# Patient Record
Sex: FEMALE | Race: WHITE | Employment: OTHER | URBAN - METROPOLITAN AREA
[De-identification: names, ages, dates, MRNs, and addresses within clinical notes are randomized per-mention and may not be internally consistent; named-entity substitution may affect disease eponyms.]

---

## 2018-08-14 PROBLEM — E66.01 OBESITY, MORBID (HCC): Status: ACTIVE | Noted: 2018-08-14

## 2018-09-20 ENCOUNTER — APPOINTMENT (OUTPATIENT)
Dept: PHYSICAL THERAPY | Age: 78
End: 2018-09-20
Payer: MEDICARE

## 2018-09-24 ENCOUNTER — HOSPITAL ENCOUNTER (OUTPATIENT)
Dept: PHYSICAL THERAPY | Age: 78
Discharge: HOME | End: 2018-09-24
Payer: MEDICARE

## 2018-09-24 PROCEDURE — 97162 PT EVAL MOD COMPLEX 30 MIN: CPT | Performed by: PHYSICAL THERAPIST

## 2018-09-24 PROCEDURE — G8978 MOBILITY CURRENT STATUS: HCPCS | Performed by: PHYSICAL THERAPIST

## 2018-09-24 PROCEDURE — G8979 MOBILITY GOAL STATUS: HCPCS | Performed by: PHYSICAL THERAPIST

## 2018-09-24 NOTE — PROGRESS NOTES
PHYSICAL THERAPY - DAILY TREATMENT NOTE Patient Name: Jose Garzon        Date: 2018 : 1940   YES Patient  Verified Visit #:     Insurance: Payor: Sharon Breath / Plan: Severiano Sosa / Product Type: Medicare / In time: 11:30 Out time: 12:05 Total Treatment Time: 35 Medicare Time Tracking (below) Total Timed Codes (min):  - 1:1 Treatment Time:  -  
 
TREATMENT AREA = Balance problem [R26.89] Gait disturbance [R26.9] SUBJECTIVE Pain Level (on 0 to 10 scale):  0  / 10 Medication Changes/New allergies or changes in medical history, any new surgeries or procedures? NO    If yes, update Summary List  
Subjective Functional Status/Changes:  []  No changes reported See eval/POC OBJECTIVE Modalities Rationale:   NA 
 min [] Estim, type/location:    
                                 []  att     []  unatt     []  w/US     []  w/ice    []  w/heat  
 min []  Mechanical Traction: type/lbs   
               []  pro   []  sup   []  int   []  cont    []  before manual    []  after manual  
 min []  Ultrasound, settings/location:    
 min []  Iontophoresis w/ dexamethasone, location:   
                                           []  take home patch       []  in clinic  
 min []  Ice     []  Heat    location/position:   
 min []  Vasopneumatic Device, press/temp:   
 min []  Other:   
[] Skin assessment post-treatment (if applicable):   
[]  intact    []  redness- no adverse reaction    
[]redness  adverse reaction:     
 
- min Therapeutic Exercise:  []  See flow sheet  
 
 min Therapeutic Activity:   
 
 
 min Neuromuscular Re-ed:   
 
 
 min Gait Training:   
 
 min Patient Education:  YES  Reviewed HEP []  Progressed/Changed HEP based on: Other Objective/Functional Measures: FOTO - 40 Post Treatment Pain Level (on 0 to 10) scale:   0  / 10 ASSESSMENT Assessment/Changes in Function: Signs and symptoms suggest general deconditioning and balance deficits. []  See Progress Note/Recertification Patient will continue to benefit from skilled PT services to modify and progress therapeutic interventions, address functional mobility deficits, address ROM deficits, address strength deficits, analyze and address soft tissue restrictions, analyze and cue movement patterns, analyze and modify body mechanics/ergonomics and assess and modify postural abnormalities to attain remaining goals. Progress toward goals / Updated goals: 
 
Goals established today PLAN [x]  Upgrade activities as tolerated YES Continue plan of care  
[]  Discharge due to :   
[]  Other:   
 
Therapist: Anabell English PT Date: 9/24/2018 Time: 11:20 AM  
 
No future appointments.

## 2018-09-24 NOTE — PROGRESS NOTES
[address] [address] [facility] [place] [facility]YSICAL [facility]ERAP[facility]  [facility] 
[facility] [address], [address] ,[address], [address]  Phone: [phone]  Fax: [phone] PLAN OF CARE / STATEMENT OF MEDICAL NECESSITY FOR PHYSICAL THERAPY SERVICES Patient Name: Celsa Padron : 1940 Medical  
Diagnosis: Balance problem [R26.89] Gait disturbance [R26.9] Treatment Diagnosis: Balance Deficits Onset Date: Chronic Referral Source: Sree Platt MD [facility]): 2018 Prior Hospitalization: See medical history Provider #: [id number] Prior Level of Function: Pt using rollator in/out of home Comorbidities: Charcot feet, h/o bilateral reverse TSA, bilateral knee surgeries (scars look like TKA), breast CA, HTN, fibromyalgia, thyroid dysufnction Medications: Verified on Patient Summary List  
The Plan of Care and following information is based on the information from the initial evaluation.  
=========================================================================================== Assessment / key information:  75 y/o female presents to PT with c/o decreased balance and endurancesince having 2 hospitalizations this year (repsiratory related), and recent right foot wound surgery and infection treatment with IV antibiotics. Pt had HHPT for several weeks after her surgery, and has been able to gradually resume walking with rollator instead of FFW. Pt wears high custom foot/ankle boots that stabilize her foot/ankle bilaterally when she is walking. She has been wearing these type of footwear to protect her Charcot feet, but she has resultant balance deficits due to limited ankle mobility. Examination reveals that pt is able to transfer her self up/down from armchair, but requires several trials at times. She is able to stand without holding onto walker for 30s, but feels off balance.   LE strength testing revealed 4/5 tests for bilateral hip and knee strength (ankle not assessed). Pt's spouse assist her with dressing, transfers, showers and transportation and pt wants to be more stable and independent with walking and transferring herself. Pt was 30 minutes late to today's appt and GRIDER balance assessment was not completed. Pt would benefit from PT to increase LE strength/endurance/balance to allow improved transfers and endurance with community ambulation. =========================================================================================== Eval Complexity: History HIGH Complexity :3+ comorbidities / personal factors will impact the outcome/ POC ;  Examination  HIGH Complexity : 4+ Standardized tests and measures addressing body structure, function, activity limitation and / or participation in recreation ; Presentation MEDIUM Complexity : Evolving with changing characteristics ; Decision Making MEDIUM Complexity : FOTO score of 26-74; Overall Complexity MEDIUM Problem List: decrease strength, impaired gait/ balance, decrease ADL/ functional abilitiies, decrease activity tolerance, decrease flexibility/ joint mobility and decrease transfer abilities Treatment Plan may include any combination of the following: Therapeutic exercise, Therapeutic activities, Neuromuscular re-education, Physical agent/modality, Gait/balance training, Manual therapy, Dry Needling, Aquatic therapy, Patient education, Self Care training, Functional mobility training, Home safety training and Stair training Patient / Family readiness to learn indicated by: asking questions, trying to perform skills and interestPersons(s) to be included in education: patient (P) and family support person (FSP);list spouse Barriers to Learning/Limitations: None Measures taken:   
Patient Goal (s): \"get better balance for walking\" Patient self reported health status: poor Rehabilitation Potential: good? Short Term Goals: To be accomplished in  2  weeks: 1. Pt independent with basic HEP for bilateral LE strengthening/endurance with supine and standing exercises. 2. Pt/spouse to note increased ease of leaving home and getting in/out of car to allow her to more frequently leave home. ? Long Term Goals: To be accomplished in  6  weeks: 1. Pt to increase FOTO score from 40 to >/= 49. 
2. Pt able to improve GRIDER balance score by 10%. 3. Pt independent with HEP for LE strengthening/endurance and balance exercises at home. Frequency / Duration:   Patient to be seen  2  times per week for 6  weeks: 
Patient / Caregiver education and instruction: self care, activity modification and exercises G-Codes (GP): Mobility [id number] Current  CL= 60-79%  [id number] Goal  CK= 40-59%. The severity rating is based on the FOTO Score Therapist Signature: David Guerrier PT Date: 9/24/2018 Certification Period: 9/24/18 - 12/24/18 Time: 11:20 AM  
=========================================================================================== I certify that the above Physical Therapy Services are being furnished while the patient is under my care. I agree with the treatment plan and certify that this therapy is necessary. Physician Signature:       Date:      Time:  Please sign and return to [facility] or you may fax the signed copy to [phone]. Thank you.

## 2018-09-27 ENCOUNTER — HOSPITAL ENCOUNTER (OUTPATIENT)
Dept: PHYSICAL THERAPY | Age: 78
Discharge: HOME | End: 2018-09-27
Payer: MEDICARE

## 2018-09-27 PROCEDURE — 97110 THERAPEUTIC EXERCISES: CPT | Performed by: PHYSICAL THERAPIST

## 2018-09-27 NOTE — PROGRESS NOTES
PHYSICAL THERAPY - DAILY TREATMENT NOTE Patient Name: Mireya Martins        Date: 2018 : 1940   YES Patient  Verified Visit #:   2   of     Insurance: Payor: Thiago Spotted / Plan: [place] MEDICARE PART A & B / Product Type: Medicare / In time: 2:30 Out time: 3:15 Total Treatment Time: 45 Medicare Time Tracking (below) Total Timed Codes (min):  45 1:1 Treatment Time:  45 TREATMENT AREA = Balance problem [R26.89] Gait disturbance [R26.9] SUBJECTIVE Pain Level (on 0 to 10 scale):  0  / 10 Medication Changes/New allergies or changes in medical history, any new surgeries or procedures? NO    If yes, update Summary List  
Subjective Functional Status/Changes:  []  No changes reported Pt reports having difficulty when trying to get her legs into bed without her  to help. OBJECTIVE Modalities Rationale:    NA 
 min [] Estim, type/location:    
                                 []  att     []  unatt     []  w/US     []  w/ice    []  w/heat  
 min []  Mechanical Traction: type/lbs   
               []  pro   []  sup   []  int   []  cont    []  before manual    []  after manual  
 min []  Ultrasound, settings/location:    
 min []  Iontophoresis w/ dexamethasone, location:   
                                           []  take home patch       []  in clinic  
 min []  Ice     []  Heat    location/position:   
 min []  Vasopneumatic Device, press/temp:   
 min []  Other:   
[] Skin assessment post-treatment (if applicable):   
[]  intact    []  redness- no adverse reaction    
[]redness  adverse reaction:     
 
45 min Therapeutic Exercise:  [x]  See flow sheet Rationale:      increase ROM, increase strength, improve coordination, improve balance and increase proprioception to improve the patients ability to increase standing stability/endurance  
 
 min Therapeutic Activity:   
 
 
 min Neuromuscular Re-ed:   
 
 
 min Gait Training: min Patient Education:  YES  Reviewed HEP []  Progressed/Changed HEP based on: Other Objective/Functional Measures: Added seated and standing exercises today per flowsheet GRIDER - 18 Post Treatment Pain Level (on 0 to 10) scale:   0  / 10 ASSESSMENT Assessment/Changes in Function:  
 
Pt required regular rest breaks, but was able to complete all therex today. []  See Progress Note/Recertification Patient will continue to benefit from skilled PT services to modify and progress therapeutic interventions, address functional mobility deficits, address ROM deficits, address strength deficits, analyze and address soft tissue restrictions, analyze and cue movement patterns, analyze and modify body mechanics/ergonomics and assess and modify postural abnormalities to attain remaining goals. Progress toward goals / Updated goals: Will progress strengthening and balance exercises as tolerated. PLAN [x]  Upgrade activities as tolerated YES Continue plan of care  
[]  Discharge due to :   
[]  Other:   
 
Therapist: Stafford Runner, PT Date: 9/27/2018 Time: 9:42 AM  
 
Future Appointments Date Time Provider Naun Torres 10/2/2018 3:30 PM Juju Lucas [facility]  
10/4/2018 2:30 PM Stafford Runner, PT [facility] [place]  
[facility] [place]  
10/11/2018 4:30 PM Irena Merida PT [facility]  
10/16/2018 3:30 PM Juju Shayy [facility]  
10/17/2018 5:00 PM Stafford Runner, PT [facility] [place]  
10/23/2018 3:30 PM Juju [facility]  
10/25/2018 3:30 PM Juju [facility]  
[facility] [place]  
10/31/2018 4:30 PM Stafford Runner, PT [facility] [place]

## 2018-10-02 ENCOUNTER — APPOINTMENT (OUTPATIENT)
Dept: PHYSICAL THERAPY | Age: 78
End: 2018-10-02
Payer: MEDICARE

## 2018-10-03 ENCOUNTER — HOSPITAL ENCOUNTER (OUTPATIENT)
Dept: PHYSICAL THERAPY | Age: 78
Discharge: HOME | End: 2018-10-03
Payer: MEDICARE

## 2018-10-03 PROCEDURE — 97110 THERAPEUTIC EXERCISES: CPT | Performed by: PHYSICAL THERAPIST

## 2018-10-03 NOTE — PROGRESS NOTES
PHYSICAL THERAPY - DAILY TREATMENT NOTE Patient Name: Madeline Carter        Date: 10/3/2018 : 1940   YES Patient  Verified Visit #:   3   of     Insurance: Payor: Dorian Bernal / Plan: Severiano Da Silvay / Product Type: Medicare / In time: 4:40 Out time: 5:20 Total Treatment Time: 40 Medicare Time Tracking (below) Total Timed Codes (min):  40 1:1 Treatment Time:  40 TREATMENT AREA = Balance problem [R26.89] Gait disturbance [R26.9] SUBJECTIVE Pain Level (on 0 to 10 scale):  0  / 10 Medication Changes/New allergies or changes in medical history, any new surgeries or procedures? NO    If yes, update Summary List  
Subjective Functional Status/Changes:  []  No changes reported Pt reports still having difficulty getting her legs in/out of bed on her own due to rails set up at bedside to keep her from rolling out of bed. OBJECTIVE Modalities Rationale:   NA 
 min [] Estim, type/location:    
                                 []  att     []  unatt     []  w/US     []  w/ice    []  w/heat  
 min []  Mechanical Traction: type/lbs   
               []  pro   []  sup   []  int   []  cont    []  before manual    []  after manual  
 min []  Ultrasound, settings/location:    
 min []  Iontophoresis w/ dexamethasone, location:   
                                           []  take home patch       []  in clinic  
 min []  Ice     []  Heat    location/position:   
 min []  Vasopneumatic Device, press/temp:   
 min []  Other:   
[] Skin assessment post-treatment (if applicable):   
[]  intact    []  redness- no adverse reaction    
[]redness  adverse reaction:     
 
40 min Therapeutic Exercise:  [x]  See flow sheet Rationale:      increase ROM, increase strength and improve coordination to improve the patients ability to increase LE strength/endurance  
 
 
 min Therapeutic Activity:   
 
 
 min Neuromuscular Re-ed:   
 
 
 min Gait Training: min Patient Education:  YES  Reviewed HEP []  Progressed/Changed HEP based on: Other Objective/Functional Measures: Added supine SLR, and sidelying SL Added 2lb resistance to LAQ Post Treatment Pain Level (on 0 to 10) scale:   0  / 10 ASSESSMENT Assessment/Changes in Function:  
 
Pt required regular rest breaks due to SOB. She had difficulty with sidelying L hip SLR. []  See Progress Note/Recertification Patient will continue to benefit from skilled PT services to modify and progress therapeutic interventions, address functional mobility deficits, address ROM deficits, address strength deficits, analyze and address soft tissue restrictions, analyze and cue movement patterns, analyze and modify body mechanics/ergonomics and assess and modify postural abnormalities to attain remaining goals. Progress toward goals / Updated goals: Will continue to progress therex for strength/endurance and balance as tolerated. PLAN [x]  Upgrade activities as tolerated YES Continue plan of care  
[]  Discharge due to :   
[]  Other:   
 
Therapist: Coleman Mendenhall PT Date: 10/3/2018 Time: 10:36 AM  
 
Future Appointments Date Time Provider Naun Torres 10/3/2018 4:30 PM [place] [facility] [place]  
10/4/2018 2:30 PM Coleman [place]enhall [facility] [place]  
10/9/2018 3:30 PM [facility]  
10/11/2018 4:30 PM [facility]  
[facility]  
10/17/2018 5:00 PM Coleman Mendenhall [facility] [place] [facility]  
[facility]  
10/25/2018 3:30 PM [facility]  
10/30/2018 3:30 PM [facility]  
10/31/2018 4:30 PM Coleman Mendenhall [facility]

## 2018-10-04 ENCOUNTER — HOSPITAL ENCOUNTER (OUTPATIENT)
Dept: PHYSICAL THERAPY | Age: 78
Discharge: HOME | End: 2018-10-04
Payer: MEDICARE

## 2018-10-04 PROCEDURE — 97110 THERAPEUTIC EXERCISES: CPT | Performed by: PHYSICAL THERAPIST

## 2018-10-04 NOTE — PROGRESS NOTES
PHYSICAL THERAPY - DAILY TREATMENT NOTE Patient Name: Mamta Wu        Date: 10/4/2018 : 1940   YES Patient  Verified Visit #:     Insurance: Payor: Cecilia Martins / Plan: Severiano Sosa / Product Type: Medicare / In time: 2:25 Out time: 3:05 Total Treatment Time: 40 Medicare Time Tracking (below) Total Timed Codes (min):  40 1:1 Treatment Time:  40 TREATMENT AREA = Balance problem [R26.89] Gait disturbance [R26.9] SUBJECTIVE Pain Level (on 0 to 10 scale):  0  / 10 Medication Changes/New allergies or changes in medical history, any new surgeries or procedures? NO    If yes, update Summary List  
Subjective Functional Status/Changes:  []  No changes reported Pt denies having any soreness in legs after session yesterday. OBJECTIVE Modalities Rationale:    NA 
 min [] Estim, type/location:    
                                 []  att     []  unatt     []  w/US     []  w/ice    []  w/heat  
 min []  Mechanical Traction: type/lbs   
               []  pro   []  sup   []  int   []  cont    []  before manual    []  after manual  
 min []  Ultrasound, settings/location:    
 min []  Iontophoresis w/ dexamethasone, location:   
                                           []  take home patch       []  in clinic  
 min []  Ice     []  Heat    location/position:   
 min []  Vasopneumatic Device, press/temp:   
 min []  Other:   
[] Skin assessment post-treatment (if applicable):   
[]  intact    []  redness- no adverse reaction    
[]redness  adverse reaction:     
 
40 min Therapeutic Exercise:  [x]  See flow sheet Rationale:      increase ROM, increase strength and improve coordination to improve the patients ability to increase standing/walking tolerance  
 
 min Therapeutic Activity:   
 
 
 min Neuromuscular Re-ed:   
 
 
 min Gait Training:   
 
 min Patient Education:  YES  Reviewed HEP []  Progressed/Changed HEP based on: Other Objective/Functional Measures: 
 
Practiced walking in clinic with SPC ( R hand) Post Treatment Pain Level (on 0 to 10) scale:   0  / 10 ASSESSMENT Assessment/Changes in Function:  
 
Pt is progressing slowly with regaining LE strength/endurance and overall standing stability. []  See Progress Note/Recertification Patient will continue to benefit from skilled PT services to modify and progress therapeutic interventions, address functional mobility deficits, address ROM deficits, address strength deficits, analyze and address soft tissue restrictions, analyze and cue movement patterns, analyze and modify body mechanics/ergonomics and assess and modify postural abnormalities to attain remaining goals. Progress toward goals / Updated goals: Will continue to gradual progression of therex for LE strength/endurance and balance. PLAN [x]  Upgrade activities as tolerated YES Continue plan of care  
[]  Discharge due to :   
[]  Other:   
 
Therapist: Usman Mart PT Date: 10/4/2018 Time: 12:46 PM  
 
Future Appointments Date Time Provider Naun Torres 10/4/2018 2:30 PM Usman Mart PT [facility]  
10/9/2018 3:30 PM [facility]  
10/11/2018 4:30 PM [facility]  
10/16/2018 3:30 PM [facility]  
10/17/2018 5:00 PM Usman Mart PT [facility]  
10/23/2018 3:30 PM [facility]  
10/25/2018 3:30 PM [facility]  
10/30/2018 3:30 PM [facility]  
10/31/2018 4:30 PM Usman Mart PT [facility]

## 2018-10-09 ENCOUNTER — HOSPITAL ENCOUNTER (OUTPATIENT)
Dept: PHYSICAL THERAPY | Age: 78
Discharge: HOME | End: 2018-10-09
Payer: MEDICARE

## 2018-10-09 PROCEDURE — 97110 THERAPEUTIC EXERCISES: CPT

## 2018-10-09 NOTE — PROGRESS NOTES
PHYSICAL THERAPY - DAILY TREATMENT NOTE Patient Name: Lucienne Spatz        Date: 10/9/2018 : 1940   yes Patient  Verified Visit #:      of     Insurance: Payor: Katie Points / Plan: Severiano Sosa / Product Type: Medicare / In time: 3:35P Out time: 4:25P Total Treatment Time: 40 Medicare/ BCBS Time Tracking (below) Total Timed Codes (min):  40 1:1 Treatment Time:  25 TREATMENT AREA =  Balance problem [R26.89] Gait disturbance [R26.9] SUBJECTIVE Pain Level (on 0 to 10 scale):  0  / 10 Medication Changes/New allergies or changes in medical history, any new surgeries or procedures?    no  If yes, update Summary List  
Subjective Functional Status/Changes:  []  No changes reported \"I am so hot today and I have been out all day long. I just don't feel as energetic as normal\" OBJECTIVE Modalities Rationale:   NA 
 min [] Estim, type/location:    
                                 []  att     []  unatt     []  w/US     []  w/ice    []  w/heat  
 min []  Mechanical Traction: type/lbs   
               []  pro   []  sup   []  int   []  cont    []  before manual    []  after manual  
 min []  Ultrasound, settings/location:    
 min []  Iontophoresis w/ dexamethasone, location:   
                                           []  take home patch       []  in clinic  
 min []  Ice     []  Heat    location/position:   
 min []  Vasopneumatic Device, press/temp:   
 min []  Other:   
[] Skin assessment post-treatment (if applicable):   
[]  intact    []  redness- no adverse reaction    
[]redness  adverse reaction:     
 
40/25 min Therapeutic Exercise:  [x]  See flow sheet Rationale:      increase ROM, increase strength, improve coordination, improve balance and increase proprioception to improve the patients ability to perform unlimited ADLs  
 
 
 
 min Patient Education:  yes  Reviewed HEP []  Progressed/Changed HEP based on: Other Objective/Functional Measures: 
 
Required rest breaks x 3 during amb with SPC approx 45ft, min A x 1 to prevent LOB. Notable SOB after all standing therex Added LAQ with 2# Max cuing for forward weight shift during sit to stand Post Treatment Pain Level (on 0 to 10) scale:   0  / 10 ASSESSMENT Assessment/Changes in Function:  
  
Pt is making good progress with PT rx, she continues to have increased fatigue with all standing therex. []  See Progress Note/Recertification Patient will continue to benefit from skilled PT services to modify and progress therapeutic interventions to attain remaining goals. Progress toward goals / Updated goals: 
 
Slow progress towards all STG. PLAN [x]  Upgrade activities as tolerated yes Continue plan of care  
[]  Discharge due to :   
[]  Other:   
 
Therapist: Con Magallon PT, DPT Date: 10/9/2018 Time: 4:18 PM  
 
Future Appointments Date Time Provider Naun Torres 10/11/2018 4:30 PM [facility]  
10/16/2018 3:30 PM [facility]  
10/17/2018 5:00 PM Geovanny Copeland PT [facility]  
10/23/2018 3:30 PM [facility]  
10/25/2018 3:30 PM [facility]  
10/30/2018 3:30 PM [facility]  
10/31/2018 4:30 PM Geovanny Copeland PT [facility] [place]

## 2018-10-11 ENCOUNTER — HOSPITAL ENCOUNTER (OUTPATIENT)
Dept: PHYSICAL THERAPY | Age: 78
Discharge: HOME | End: 2018-10-11
Payer: MEDICARE

## 2018-10-11 PROCEDURE — 97110 THERAPEUTIC EXERCISES: CPT

## 2018-10-11 NOTE — PROGRESS NOTES
PHYSICAL THERAPY - DAILY TREATMENT NOTE Patient Name: Vickie Chiu        Date: 10/11/2018 : 1940   yes Patient  Verified Visit #:     Insurance: Payor: Lety Bentley / Plan: Severiano Sosa / Product Type: Medicare / In time: 4;30P Out time: 5:10P Total Treatment Time: 40 Medicare/ BCBS Time Tracking (below) Total Timed Codes (min):  40 1:1 Treatment Time:  40 TREATMENT AREA =  Balance problem [R26.89] Gait disturbance [R26.9] SUBJECTIVE Pain Level (on 0 to 10 scale):  0  / 10 Medication Changes/New allergies or changes in medical history, any new surgeries or procedures?    no  If yes, update Summary List  
Subjective Functional Status/Changes:  []  No changes reported \"My leg is really swollen today,  I think its lymph\" OBJECTIVE Modalities Rationale:     NA 
 min [] Estim, type/location:    
                                 []  att     []  unatt     []  w/US     []  w/ice    []  w/heat  
 min []  Mechanical Traction: type/lbs   
               []  pro   []  sup   []  int   []  cont    []  before manual    []  after manual  
 min []  Ultrasound, settings/location:    
 min []  Iontophoresis w/ dexamethasone, location:   
                                           []  take home patch       []  in clinic  
 min []  Ice     []  Heat    location/position:   
 min []  Vasopneumatic Device, press/temp:   
 min []  Other:   
[] Skin assessment post-treatment (if applicable):   
[]  intact    []  redness- no adverse reaction    
[]redness  adverse reaction:     
 
40 min Therapeutic Exercise:  [x]  See flow sheet Rationale:      increase ROM, increase strength, improve coordination, improve balance and increase proprioception to improve the patients ability to perform unlimited ADLs  
 
 
 min Patient Education:  yes  Reviewed HEP []  Progressed/Changed HEP based on: Other Objective/Functional Measures: Notable wheezing and SOB with supine SLR. Post Treatment Pain Level (on 0 to 10) scale:   0  / 10 ASSESSMENT Assessment/Changes in Function:  
 
Patient is making slow progress with PT rx, did not perform gait with SPC due to inability to fasten boots. []  See Progress Note/Recertification Patient will continue to benefit from skilled PT services to modify and progress therapeutic interventions, address functional mobility deficits, address ROM deficits, address strength deficits, analyze and address soft tissue restrictions, analyze and cue movement patterns, analyze and modify body mechanics/ergonomics, assess and modify postural abnormalities, address imbalance/dizziness and instruct in home and community integration to attain remaining goals. Progress toward goals / Updated goals: 
 
Slow progress towards STG 3. PLAN [x]  Upgrade activities as tolerated yes Continue plan of care  
[]  Discharge due to :   
[]  Other:   
 
Therapist: Danuta Ford PT, DPT Date: 10/11/2018 Time: 5:16 PM  
 
Future Appointments Date Time Provider Naun Torres 10/16/2018 3:30 PM [facility]  
10/17/2018 5:00 PM [place] [facility]  
[facility]  
10/25/2018 3:30 PM [facility]  
10/30/2018 3:30 PM [place] [facility]  
10/31/2018 4:30 PM [place] Landa PT [facility]

## 2018-10-16 ENCOUNTER — HOSPITAL ENCOUNTER (OUTPATIENT)
Dept: PHYSICAL THERAPY | Age: 78
Discharge: HOME | End: 2018-10-16
Payer: MEDICARE

## 2018-10-16 PROCEDURE — 97110 THERAPEUTIC EXERCISES: CPT

## 2018-10-16 NOTE — PROGRESS NOTES
PHYSICAL THERAPY - DAILY TREATMENT NOTE Patient Name: Marcelino Murphy        Date: 10/16/2018 : 1940   yes Patient  Verified Visit #:     Insurance: Payor: Justice Part / Plan: Severiano Da Silvay / Product Type: Medicare / In time: 3:30P Out time: 4:10P Total Treatment Time: 40 Medicare/ BCBS Time Tracking (below) Total Timed Codes (min):  40 1:1 Treatment Time:  40 TREATMENT AREA =  Balance problem [R26.89] Gait disturbance [R26.9] SUBJECTIVE Pain Level (on 0 to 10 scale):  0  / 10 Medication Changes/New allergies or changes in medical history, any new surgeries or procedures?    no  If yes, update Summary List  
Subjective Functional Status/Changes:  []  No changes reported \"I am doing good I have been out and about today. \" OBJECTIVE Modalities Rationale: NA 
 min [] Estim, type/location:   
                                 []  att     []  unatt     []  w/US     []  w/ice    []  w/heat 
 min []  Mechanical Traction: type/lbs   
               []  pro   []  sup   []  int   []  cont    []  before manual    []  after manual  
 min []  Ultrasound, settings/location:    
 min []  Iontophoresis w/ dexamethasone, location:   
                                           []  take home patch       []  in clinic  
 min []  Ice     []  Heat    location/position:   
 min []  Vasopneumatic Device, press/temp:   
 min []  Other:   
[] Skin assessment post-treatment (if applicable):   
[]  intact    []  redness- no adverse reaction    
[]redness  adverse reaction:     
40 min Therapeutic Exercise:  [x]  See flow sheet Rationale:      increase ROM, increase strength, improve coordination, improve balance and increase proprioception to improve the patients ability to perform unlimited ADLs  
  min Patient Education:  yes  Reviewed HEP []  Progressed/Changed HEP based on: Other Objective/Functional Measures: 
 
Notable decrease in R calf/ lower leg swelling Improved sit ot stand transfers Post Treatment Pain Level (on 0 to 10) scale:   0  / 10 ASSESSMENT Assessment/Changes in Function:  
 
Max cuing for safety during stand to sit transfers to reach back for chair and ensure chair is nearby. Increased SOB during all standing therex today. Pt c/o HA towards end of session, requesting to be finished. (no red flags identified.) []  See Progress Note/Recertification Patient will continue to benefit from skilled PT services to modify and progress therapeutic interventions, address functional mobility deficits, address ROM deficits, address strength deficits, analyze and address soft tissue restrictions, analyze and cue movement patterns, analyze and modify body mechanics/ergonomics, assess and modify postural abnormalities, address imbalance/dizziness and instruct in home and community integration to attain remaining goals. Progress toward goals / Updated goals: 
Progressing towards LTG 3. PLAN [x]  Upgrade activities as tolerated yes Continue plan of care  
[]  Discharge due to :   
[]  Other:   
 
Therapist: Natalya Hernandez PT, DPT Date: 10/16/2018 Time: 3:48 PM  
 
Future Appointments Date Time Provider Naun Torres 10/17/2018 5:00 PM Jonathan Gonzáles PT [facility]  
10/23/2018 3:30 PM [facility]  
10/25/2018 3:30 PM [facility]  
10/30/2018 3:30 PM [facility]  
10/31/2018 4:30 PM Jonathan Gonzáles PT [facility]

## 2018-10-17 ENCOUNTER — HOSPITAL ENCOUNTER (OUTPATIENT)
Dept: PHYSICAL THERAPY | Age: 78
End: 2018-10-17
Payer: MEDICARE

## 2018-10-18 ENCOUNTER — HOSPITAL ENCOUNTER (OUTPATIENT)
Dept: PHYSICAL THERAPY | Age: 78
Discharge: HOME | End: 2018-10-18
Payer: MEDICARE

## 2018-10-18 PROCEDURE — 97110 THERAPEUTIC EXERCISES: CPT | Performed by: PHYSICAL THERAPIST

## 2018-10-18 NOTE — PROGRESS NOTES
PHYSICAL THERAPY - DAILY TREATMENT NOTE Patient Name: Mireya Martins        Date: 10/18/2018 : 1940   YES Patient  Verified Visit #:     Insurance: Payor: Thiago Stark / Plan: Severiano Sosa / Product Type: Medicare / In time: 12:35 Out time: 1:05 Total Treatment Time: 30 Medicare Time Tracking (below) Total Timed Codes (min):  25 1:1 Treatment Time:  25 TREATMENT AREA = Other abnormalities of gait and mobility [R26.89] Unspecified abnormalities of gait and mobility [R26.9] SUBJECTIVE Pain Level (on 0 to 10 scale):  0  / 10 Medication Changes/New allergies or changes in medical history, any new surgeries or procedures? NO    If yes, update Summary List  
Subjective Functional Status/Changes:  []  No changes reported Pt reports having an upset stomach today for unknown reasons, but wants to try and do the exercises today. OBJECTIVE Modalities Rationale:  NA 
 min [] Estim, type/location:   
                                 []  att     []  unatt     []  w/US     []  w/ice    []  w/heat 
 min []  Mechanical Traction: type/lbs   
               []  pro   []  sup   []  int   []  cont    []  before manual    []  after manual  
 min []  Ultrasound, settings/location:    
 min []  Iontophoresis w/ dexamethasone, location:   
                                           []  take home patch       []  in clinic  
 min []  Ice     []  Heat    location/position:   
 min []  Vasopneumatic Device, press/temp:   
 min []  Other:   
[] Skin assessment post-treatment (if applicable):   
[]  intact    []  redness- no adverse reaction    
[]redness  adverse reaction:     
30(25) min Therapeutic Exercise:  [x]  See flow sheet Rationale:      increase ROM, increase strength, improve coordination and improve balance to improve the patients ability to increase standing/walking tolerance  
 min Therapeutic Activity:   
 
 min Neuromuscular Re-ed:   
 
 min Gait Training: min Patient Education:  YES  Reviewed HEP []  Progressed/Changed HEP based on: Other Objective/Functional Measures: 
 
Held bed exercises and walking w SPC, as pt was not feeling well Post Treatment Pain Level (on 0 to 10) scale:   0  / 10 ASSESSMENT Assessment/Changes in Function: Pt was able to complete all sitting and standing exercises without upsetting her stomach. []  See Progress Note/Recertification Patient will continue to benefit from skilled PT services to modify and progress therapeutic interventions, address functional mobility deficits, address ROM deficits, address strength deficits, analyze and address soft tissue restrictions, analyze and cue movement patterns, analyze and modify body mechanics/ergonomics and assess and modify postural abnormalities to attain remaining goals. Progress toward goals / Updated goals: Will continue to increase LE strength/endurance and increase standing balance while standing/walking PLAN [x]  Upgrade activities as tolerated YES Continue plan of care  
[]  Discharge due to :   
[]  Other:   
 
Therapist: Coleman Mendenhall PT Date: 10/18/2018 Time: 9:47 AM  
 
Future Appointments Date Time Provider Naun Torres 10/18/2018 12:30 PM Martin Portillo, PT [facility]  
10/23/2018  3:30 PM [facility]  
10/25/2018  3:30 PM [facility]  
10/30/2018  3:30 PM Verona Crowder [facility]  
10/31/2018  4:30 PM Bandar Powell, PT [facility]

## 2018-10-23 ENCOUNTER — HOSPITAL ENCOUNTER (OUTPATIENT)
Dept: PHYSICAL THERAPY | Age: 78
Discharge: HOME | End: 2018-10-23
Payer: MEDICARE

## 2018-10-23 PROCEDURE — G8978 MOBILITY CURRENT STATUS: HCPCS

## 2018-10-23 PROCEDURE — 97110 THERAPEUTIC EXERCISES: CPT

## 2018-10-23 PROCEDURE — G8979 MOBILITY GOAL STATUS: HCPCS

## 2018-10-23 NOTE — PROGRESS NOTES
Laurent Cooper [address]  [facility] AT [address] [place] [address] [facility], [address] [place] [address], [address]  Phone: [phone]  Fax: [phone] PROGRESS NOTE Patient Name: Temi Rasheed : 1940 Treatment/Medical Diagnosis: Balance problem [R26.89] Gait disturbance [R26.9] Referral Source: Jessica Ag MD    
Date of Initial Visit: 18 Attended Visits: 9 Missed Visits: 1 SUMMARY OF TREATMENT Therapeutic exercise to increase strength, range of motion, balance, coordination, and transfer ability. CURRENT STATUS Ms. Harley Martines is making slow progress with PT rx. She continues to display limited use of ankle and hip balance strategies however is having improvements in ability to perform sit ot stand transfers with good safety and proper WS. She is demonstrating improved tolerance to all therex requiring dec rest breaks throughout treatment. Please see below for progress towards LTG: 
 
Goal/Measure of Progress Goal Met? 1.  Pt to increase FOTO score from 40 to >/= 49. Status at last Eval: 40 Current Status: 48 progressing 2. Pt able to improve GRIDER balance score by 10%. Status at last Eval: 18 Current Status: 28 progressing 3. Pt independent with HEP for LE strengthening/endurance and balance exercises at home. Status at last Eval: established Current Status: Minimally to not compliant. progressing New Goals to be achieved in __4__  weeks: 
**CONTINUE WITH LTG 1-3. G-Codes (GP): Mobility [id number] Current  CK= 40-59%  [id number] Goal  CK= 40-59%. The severity rating is based on the FOTO Score RECOMMENDATIONS Continue PT rx 1-2x per week for an additional 4 weeks to work towards LTG> If you have any questions/comments please contact us directly at [phone]. Thank you for allowing us to assist in the care of your patient. Therapist Signature: Vera Booth PT, DPT Date: 10/23/2018 Reporting Period: 18-10/23/18 Time: 5:34 PM  
 NOTE TO PHYSICIAN:  PLEASE COMPLETE THE ORDERS BELOW AND FAX TO [facility]: [phone]. If you are unable to process this request in 24 hours please contact our office: [phone]. 
 
___ I have read the above report and request that my patient continue as recommended.  
___ I have read the above report and request that my patient continue therapy with the following changes/special instructions:_________________________________________________________  
___ I have read the above report and request that my patient be discharged from therapy.   
 
Physician Signature:       Date:      Time:

## 2018-10-23 NOTE — PROGRESS NOTES
PHYSICAL THERAPY - DAILY TREATMENT NOTE Patient Name: Ant Chandra        Date: 10/23/2018 : 1940   yes Patient  Verified Visit #:     Insurance: Payor: Shadi Pro / Plan: Severiano Sosa / Product Type: Medicare / In time: 3:45P Out time: 4:30P Total Treatment Time: 45 Medicare/ BCBS Time Tracking (below) Total Timed Codes (min):  45 1:1 Treatment Time:  40 TREATMENT AREA =  Balance problem [R26.89] Gait disturbance [R26.9] SUBJECTIVE Pain Level (on 0 to 10 scale):  0  / 10 Medication Changes/New allergies or changes in medical history, any new surgeries or procedures?    no  If yes, update Summary List  
Subjective Functional Status/Changes:  []  No changes reported \"I am hot. \" OBJECTIVE Modalities Rationale:    NA 
 min [] Estim, type/location:   
                                 []  att     []  unatt     []  w/US     []  w/ice    []  w/heat 
 min []  Mechanical Traction: type/lbs   
               []  pro   []  sup   []  int   []  cont    []  before manual    []  after manual  
 min []  Ultrasound, settings/location:    
 min []  Iontophoresis w/ dexamethasone, location:   
                                           []  take home patch       []  in clinic  
 min []  Ice     []  Heat    location/position:   
 min []  Vasopneumatic Device, press/temp:   
 min []  Other:   
[] Skin assessment post-treatment (if applicable):   
[]  intact    []  redness- no adverse reaction    
[]redness  adverse reaction:     
45/40 min Therapeutic Exercise:  [x]  See flow sheet Rationale:      increase ROM, increase strength, improve coordination, improve balance and increase proprioception to improve the patients ability to perform unlimited ADLs  
 min Patient Education:  yes  Reviewed HEP []  Progressed/Changed HEP based on: Other Objective/Functional Measures: Added MSR balance eyes open Post Treatment Pain Level (on 0 to 10) scale:   0  / 10 ASSESSMENT Assessment/Changes in Function:  
 
See PN 
  
[]  See Progress Note/Recertification Patient will continue to benefit from skilled PT services to modify and progress therapeutic interventions, address functional mobility deficits, address ROM deficits, address strength deficits, analyze and address soft tissue restrictions, analyze and cue movement patterns, analyze and modify body mechanics/ergonomics, assess and modify postural abnormalities, address imbalance/dizziness and instruct in home and community integration to attain remaining goals. Progress toward goals / Updated goals: 
See PN  
 
PLAN [x]  Upgrade activities as tolerated yes Continue plan of care  
[]  Discharge due to :   
[]  Other:   
 
Therapist: Natalya Hernandez PT, DPT Date: 10/23/2018 Time: 3:53 PM  
 
Future Appointments Date Time Provider Naun Torres 10/25/2018  3:30 PM Win Hartman [facility]  
10/30/2018  3:30 PM Kiana [facility]  
10/31/2018  4:30 PM Gustavo Powell PT [facility]

## 2018-10-25 ENCOUNTER — HOSPITAL ENCOUNTER (OUTPATIENT)
Dept: PHYSICAL THERAPY | Age: 78
Discharge: HOME | End: 2018-10-25
Payer: MEDICARE

## 2018-10-25 PROCEDURE — 97110 THERAPEUTIC EXERCISES: CPT

## 2018-10-25 NOTE — PROGRESS NOTES
PHYSICAL THERAPY - DAILY TREATMENT NOTE Patient Name: Marjorie Goff        Date: 10/25/2018 : 1940   yes Patient  Verified Visit #:   10   of   20  Insurance: Payor: Román Miranda / Plan: Severiano Sosa / Product Type: Medicare / In time: 3:35P Out time: 4:23P Total Treatment Time: 37 Medicare/ BCBS Time Tracking (below) Total Timed Codes (min):  43 1:1 Treatment Time:  37 TREATMENT AREA =  Balance problem [R26.89] Gait disturbance [R26.9] SUBJECTIVE Pain Level (on 0 to 10 scale):  0  / 10 Medication Changes/New allergies or changes in medical history, any new surgeries or procedures?    no  If yes, update Summary List  
Subjective Functional Status/Changes:  []  No changes reported \"my neck is hurting, I had a sleep study done last night and I went to the  today. \" OBJECTIVE Modalities Rationale:   NA 
 min [] Estim, type/location:   
                                 []  att     []  unatt     []  w/US     []  w/ice    []  w/heat 
 min []  Mechanical Traction: type/lbs   
               []  pro   []  sup   []  int   []  cont    []  before manual    []  after manual  
 min []  Ultrasound, settings/location:    
 min []  Iontophoresis w/ dexamethasone, location:   
                                           []  take home patch       []  in clinic  
 min []  Ice     []  Heat    location/position:   
 min []  Vasopneumatic Device, press/temp:   
 min []  Other:   
[] Skin assessment post-treatment (if applicable):   
[]  intact    []  redness- no adverse reaction    
[]redness  adverse reaction:     
43 min Therapeutic Exercise:  [x]  See flow sheet Rationale:      increase ROM, increase strength, improve coordination, improve balance and increase proprioception to improve the patients ability to perform unlimited ADLs  
 min Patient Education:  yes  Reviewed HEP []  Progressed/Changed HEP based on: Other Objective/Functional Measures: Added seated march with 2# Inc reps of all standing therex. Post Treatment Pain Level (on 0 to 10) scale:   0  / 10 ASSESSMENT Assessment/Changes in Function:  
 
Patient is making slow and steady progress with physical therapy, continues ot have fear of performing all balance exercises. []  See Progress Note/Recertification Patient will continue to benefit from skilled PT services to modify and progress therapeutic interventions, address functional mobility deficits, address ROM deficits, address strength deficits, analyze and address soft tissue restrictions, analyze and cue movement patterns, analyze and modify body mechanics/ergonomics, assess and modify postural abnormalities, address imbalance/dizziness and instruct in home and community integration to attain remaining goals. Progress toward goals / Updated goals: 
Progressing otwads LTG 2. PLAN [x]  Upgrade activities as tolerated yes Continue plan of care  
[]  Discharge due to :   
[]  Other:   
 
Therapist: Gwyn Zamora PT, DPT Date: 10/25/2018 Time: 3:55 PM  
 
Future Appointments Date Time Provider Naun Torres 10/30/2018  3:30 PM [facility]  
10/31/2018  4:30 PM Merline Powell, [place] [facility]

## 2018-10-30 ENCOUNTER — HOSPITAL ENCOUNTER (OUTPATIENT)
Dept: PHYSICAL THERAPY | Age: 78
Discharge: HOME | End: 2018-10-30
Payer: MEDICARE

## 2018-10-30 PROCEDURE — 97110 THERAPEUTIC EXERCISES: CPT

## 2018-10-30 NOTE — PROGRESS NOTES
PHYSICAL THERAPY - DAILY TREATMENT NOTE Patient Name: Damian Real        Date: 10/30/2018 : 1940   yes Patient  Verified Visit #:     Insurance: Payor: Uriel Damico / Plan: Severiano Sosa / Product Type: Medicare / In time: 3:35P Out time: 4:10P Total Treatment Time: 35 Medicare/ BCBS Time Tracking (below) Total Timed Codes (min):  35 1:1 Treatment Time:  25 TREATMENT AREA =  Balance problem [R26.89] Gait disturbance [R26.9] SUBJECTIVE Pain Level (on 0 to 10 scale):  0  / 10 Medication Changes/New allergies or changes in medical history, any new surgeries or procedures?    no  If yes, update Summary List  
Subjective Functional Status/Changes:  []  No changes reported \"I couldn't get my boot all of the way on today because m leg is swollen and I have been up and walking a lot toda. y\" OBJECTIVE Modalities Rationale:     NA 
 min [] Estim, type/location:   
                                 []  att     []  unatt     []  w/US     []  w/ice    []  w/heat 
 min []  Mechanical Traction: type/lbs   
               []  pro   []  sup   []  int   []  cont    []  before manual    []  after manual  
 min []  Ultrasound, settings/location:    
 min []  Iontophoresis w/ dexamethasone, location:   
                                           []  take home patch       []  in clinic  
 min []  Ice     []  Heat    location/position:   
 min []  Vasopneumatic Device, press/temp:   
 min []  Other:   
[] Skin assessment post-treatment (if applicable):   
[]  intact    []  redness- no adverse reaction    
[]redness  adverse reaction:     
35/25 min Therapeutic Exercise:  [x]  See flow sheet Rationale:      increase ROM, increase strength, improve coordination, improve balance and increase proprioception to improve the patients ability to perform unlimited ADLs  
 min Patient Education:  yes  Reviewed HEP []  Progressed/Changed HEP based on: Other Objective/Functional Measures: 
 
Inc reps of all standing therex today, did not perform balance due to poor fit of boot today. Post Treatment Pain Level (on 0 to 10) scale:   0  / 10 ASSESSMENT Assessment/Changes in Function:  
 
Patient is making slow and gradual progress with PT rx, she requires less rest breaks throughout all of treatment. []  See Progress Note/Recertification Patient will continue to benefit from skilled PT services to modify and progress therapeutic interventions, address functional mobility deficits, address ROM deficits, address strength deficits, analyze and address soft tissue restrictions, analyze and cue movement patterns, analyze and modify body mechanics/ergonomics, assess and modify postural abnormalities, address imbalance/dizziness and instruct in home and community integration to attain remaining goals. Progress toward goals / Updated goals: 
Slow progress towards LTG PLAN [x]  Upgrade activities as tolerated yes Continue plan of care  
[]  Discharge due to :   
[]  Other:   
 
Therapist: Kaci Kraus PT, DPT Date: 10/30/2018 Time: 6:39 PM  
 
Future Appointments Date Time Provider Naun Torres 11/1/2018  1:00 PM Brannon Powell, PT [facility]

## 2018-10-31 ENCOUNTER — APPOINTMENT (OUTPATIENT)
Dept: PHYSICAL THERAPY | Age: 78
End: 2018-10-31
Payer: MEDICARE

## 2018-11-01 ENCOUNTER — HOSPITAL ENCOUNTER (OUTPATIENT)
Dept: PHYSICAL THERAPY | Age: 78
Discharge: HOME | End: 2018-11-01
Payer: MEDICARE

## 2018-11-01 PROCEDURE — 97110 THERAPEUTIC EXERCISES: CPT | Performed by: PHYSICAL THERAPIST

## 2018-11-01 NOTE — PROGRESS NOTES
PHYSICAL THERAPY - DAILY TREATMENT NOTE Patient Name: Alice Jennings        Date: 2018 : 1940   YES Patient  Verified Visit #:   15   of     Insurance: Payor: [employer] / Plan: Severiano Sosa / Product Type: Medicare / In time: 1:10 Out time: 1:50 Total Treatment Time: 40 Medicare Time Tracking (below) Total Timed Codes (min):  40 1:1 Treatment Time:  40 TREATMENT AREA = Balance problem [R26.89] Gait disturbance [R26.9] SUBJECTIVE Pain Level (on 0 to 10 scale):  0  / 10 Medication Changes/New allergies or changes in medical history, any new surgeries or procedures? NO    If yes, update Summary List  
Subjective Functional Status/Changes:  []  No changes reported Pt reports her  has been hospitalize since last Friday and her dtr has bene helping her out. OBJECTIVE Modalities Rationale:  NA 
 min [] Estim, type/location:   
                                 []  att     []  unatt     []  w/US     []  w/ice    []  w/heat 
 min []  Mechanical Traction: type/lbs   
               []  pro   []  sup   []  int   []  cont    []  before manual    []  after manual  
 min []  Ultrasound, settings/location:    
 min []  Iontophoresis w/ dexamethasone, location:   
                                           []  take home patch       []  in clinic  
 min []  Ice     []  Heat    location/position:   
 min []  Vasopneumatic Device, press/temp:   
 min []  Other:   
[] Skin assessment post-treatment (if applicable):   
[]  intact    []  redness- no adverse reaction    
[]redness  adverse reaction:     
40 min Therapeutic Exercise:  [x]  See flow sheet Rationale:      increase ROM, increase strength, improve coordination and improve balance to improve the patients ability to regain functional LE strength for transfers and increasing standing/wlakig stability  
 min Therapeutic Activity:   
 
 min Neuromuscular Re-ed:   
 
 min Gait Training: min Patient Education:  YES  Reviewed HEP []  Progressed/Changed HEP based on: Other Objective/Functional Measures: Pt was ashley to complete therex by taking regular sitting rest breaks Post Treatment Pain Level (on 0 to 10) scale:   0  / 10 ASSESSMENT Assessment/Changes in Function:  
 
Pt had some c/o thigh muscle fatigue with SAQ and mini squats today, but no signs of LE weakness/giving way. []  See Progress Note/Recertification Patient will continue to benefit from skilled PT services to modify and progress therapeutic interventions, address functional mobility deficits, address ROM deficits, address strength deficits, analyze and address soft tissue restrictions, analyze and cue movement patterns, analyze and modify body mechanics/ergonomics, assess and modify postural abnormalities and address imbalance/dizziness to attain remaining goals. Progress toward goals / Updated goals: Will continue to progress therex for LE strength/endruance as tolerated PLAN [x]  Upgrade activities as tolerated YES Continue plan of care  
[]  Discharge due to :   
[]  Other:   
 
Therapist: Helene Rodriguez, PT Date: 11/1/2018 Time: 9:55 AM  
 
Future Appointments Date Time Provider Naun Torres 11/1/2018  1:00 PM Gema Powell, PT [facility] [place]

## 2018-11-27 ENCOUNTER — HOSPITAL ENCOUNTER (OUTPATIENT)
Dept: PHYSICAL THERAPY | Age: 78
Discharge: HOME | End: 2018-11-27
Payer: MEDICARE

## 2018-11-27 PROCEDURE — 97110 THERAPEUTIC EXERCISES: CPT | Performed by: PHYSICAL THERAPIST

## 2018-11-27 NOTE — PROGRESS NOTES
[address]  [facility]  [facility] 
[address] [facility] [address], [address] ,[address], [address]  Phone: [phone]  Fax: [phone] PROGRESS NOTE Patient Name: Emil Smith : 1940 Treatment/Medical Diagnosis: Balance problem [R26.89] Gait disturbance [R26.9] Referral Source: Brock Oneill MD    
Date of Initial Visit: 18 Attended Visits: 10 Missed Visits: 1 SUMMARY OF TREATMENT Therapeutic exercise to increase strength, range of motion, balance, coordination, and transfer ability. CURRENT STATUS Pt was last seen on 18 for the first time in over 3 weeks. She did not have a ride to PT due her  being hospitalized for illness. She returns to PT with c/o increased swelling and pain in her right foot. Pt has not been wearing her foot/ankle boots as much, due to not having assistance from her . She describes increased difficulty with with standing/walking and transfers (in/out of bed). Pt requested to restart PT slowly as her R foot has been hurting more lately, therefore GRIDER assessment was not performed. Goal/Measure of Progress Goal Met? 1.  Pt to increase FOTO score from 48 to >/= 49. Status at last Eval: 48 Current Status: 37 no 2. Pt able to improve GRIDER balance score by 10%. Status at last Eval: - Current Status: N/A no  
3. Pt independent with HEP for LE strengthening/endurance and balance exercises at home. Status at last Eval: Partial Current Status: Partial Progressing New Goals to be achieved in __4__  weeks: 1. Pt to increase FOTO score from 37 to >/= 49.  
2.  Pt able to improve GRIDER balance score by 10%. 3.  Pt independent with HEP for LE strengthening/endurance and balance exercises at home. G-Codes (GP): Mobility [id number] Current  CL= 60-79%  [id number] Goal  CK= 40-59%. The severity rating is based on the FOTO Score RECOMMENDATIONS Pt would benefit from PT to increase LE endurance and balance to increase functional performance to achieve all LTGs. If you have any questions/comments please contact us directly at [phone]. Thank you for allowing us to assist in the care of your patient. Therapist Signature: Jonathan Gonzáles PT Date: 11/27/2018 Reporting Period: 10/23/18 - 11/27/2018 Time: 12:34 PM  
NOTE TO PHYSICIAN:  PLEASE COMPLETE THE ORDERS BELOW AND FAX TO [facility]: [phone]. If you are unable to process this request in 24 hours please contact our office: [phone]. 
 
___ I have read the above report and request that my patient continue as recommended.  
___ I have read the above report and request that my patient continue therapy with the following changes/special instructions:_________________________________________________________  
___ I have read the above report and request that my patient be discharged from therapy.   
 
Physician Signature:       Date:      Time:

## 2018-11-27 NOTE — PROGRESS NOTES
PHYSICAL THERAPY - DAILY TREATMENT NOTE Patient Name: Temi Rasheed        Date: 2018 : 1940   YES Patient  Verified Visit #:   15   of   17  Insurance: Payor: [employer] / Plan: Severiano Sosa / Product Type: Medicare / In time: 12:30 Out time: 1:05 Total Treatment Time: 35 Medicare Time Tracking (below) Total Timed Codes (min):  25 1:1 Treatment Time:  25 TREATMENT AREA = Balance problem [R26.89] Gait disturbance [R26.9] SUBJECTIVE Pain Level (on 0 to 10 scale):  0-8  / 10 Medication Changes/New allergies or changes in medical history, any new surgeries or procedures? NO    If yes, update Summary List  
Subjective Functional Status/Changes:  []  No changes reported Pt reports not being able to make it PT with the hospitalization of her . She has been having more difficulty getting in/out of bed and her R foot has been hurting more recently. Pt wants to resume PT to increase her LE strength/ednruance and increase stability and tolerance to transferring and walking in/out of home. With her  not home to assist, she has been standing more on her feet without wearing her supportive boots. OBJECTIVE Modalities Rationale:    NA 
 min [] Estim, type/location:   
                                 []  att     []  unatt     []  w/US     []  w/ice    []  w/heat 
 min []  Mechanical Traction: type/lbs   
               []  pro   []  sup   []  int   []  cont    []  before manual    []  after manual  
 min []  Ultrasound, settings/location:    
 min []  Iontophoresis w/ dexamethasone, location:   
                                           []  take home patch       []  in clinic  
 min []  Ice     []  Heat    location/position:   
 min []  Vasopneumatic Device, press/temp:   
 min []  Other:   
[] Skin assessment post-treatment (if applicable):   
[]  intact    []  redness- no adverse reaction    
[]redness  adverse reaction: 25 min Therapeutic Exercise:  [x]  See flow sheet Rationale:      increase ROM, increase strength, improve coordination, improve balance and increase proprioception to improve the patients ability to increase stability with walking/transfers  
 min Therapeutic Activity:   
 
 min Neuromuscular Re-ed:   
 
 min Gait Training:   
 min Patient Education:  YES  Reviewed HEP []  Progressed/Changed HEP based on: Other Objective/Functional Measures: FOTO - 37 Pt performed partial exercises due to having some right foot pain and Lower leg swelling Post Treatment Pain Level (on 0 to 10) scale:   0  / 10 ASSESSMENT Assessment/Changes in Function:  
 
Pt shows decreased endurance and standing/walking tolerance. Would benefit from resumption of PT to achieve LTGs    
[]  See Progress Note/Recertification Patient will continue to benefit from skilled PT services to modify and progress therapeutic interventions, address functional mobility deficits, address ROM deficits, address strength deficits, analyze and address soft tissue restrictions, analyze and cue movement patterns and analyze and modify body mechanics/ergonomics to attain remaining goals. Progress toward goals / Updated goals: 
Pt has shown some slight regression in functional capabilities with several week layoff from PT. PLAN [x]  Upgrade activities as tolerated YES Continue plan of care  
[]  Discharge due to :   
[]  Other:   
 
Therapist: Deedee Nathan PT Date: 11/27/2018 Time: 9:04 AM  
 
Future Appointments Date Time Provider Naun Torres 11/27/2018 12:00 PM Otis Pope PT [place]  
11/28/2018  1:00 PM Otis Pope PT [place]  
12/3/2018  3:30 PM Dylan [facility] [place]  
12/6/2018  3:00 PM Dylan  [place]

## 2018-11-28 ENCOUNTER — HOSPITAL ENCOUNTER (OUTPATIENT)
Dept: PHYSICAL THERAPY | Age: 78
End: 2018-11-28
Payer: MEDICARE

## 2018-12-03 ENCOUNTER — HOSPITAL ENCOUNTER (OUTPATIENT)
Dept: PHYSICAL THERAPY | Age: 78
End: 2018-12-03

## 2018-12-06 ENCOUNTER — APPOINTMENT (OUTPATIENT)
Dept: PHYSICAL THERAPY | Age: 78
End: 2018-12-06

## 2018-12-10 ENCOUNTER — APPOINTMENT (OUTPATIENT)
Dept: PHYSICAL THERAPY | Age: 78
End: 2018-12-10

## 2019-01-28 NOTE — PROGRESS NOTES
Laurent [address] 29  [facility]  [facility] 
[address], [address] ,[address], [address]  Phone: [phone]  Fax: [phone] DISCHARGE [facility] FOR PHYSICAL THERAPY Patient Name: Mejia Grigsby : 1940 Treatment/Medical Diagnosis: Balance problem [R26.89] Gait disturbance [R26.9] Onset Date: Chronic Referral Source: Socorro Brennan MD [facility]): 18 Prior Hospitalization: See Medical History Provider #: [id number] Prior Level of Function: Pt using rollator in/out of home Comorbidities: Charcot feet, h/o bilateral reverse TSA, bilateral knee surgeries (scars look like TKA), breast CA, HTN, fibromyalgia, thyroid dysufnction Medications: Verified on Patient Summary List  
Visits from [facility]: 10 Missed Visits: 1 Key Functional Changes/Progress: Pt was last seen in PT on 18 after a prolonged absence due to  (primary caregiver) being hospitalized for an illness. Pt was reassessed, and report sent at that time. She called to cancel her next appt on 12/3/18 due to developing another foot lesion. She did not return PT after her session on 18, and will be discharged. Assessments/Recommendations: Discontinue therapy due to lack of attendance or compliance. If you have any questions/comments please contact us directly at [phone]. Thank you for allowing us to assist in the care of your patient. Therapist Signature: Paola Figueroa PT Date: 19 Reporting Period: 10/23/18 - 18 Time: 11:28 AM

## 2022-02-25 PROBLEM — M14.671 CHARCOT'S JOINT OF RIGHT FOOT: Status: ACTIVE | Noted: 2019-04-09

## 2022-02-25 PROBLEM — I83.811 VARICOSE VEINS OF RIGHT LOWER EXTREMITY WITH PAIN: Status: ACTIVE | Noted: 2019-08-12

## 2022-02-25 PROBLEM — M86.071 ACUTE HEMATOGENOUS OSTEOMYELITIS OF RIGHT FOOT (HCC): Status: ACTIVE | Noted: 2019-10-23

## 2022-02-25 PROBLEM — D50.0 IRON DEFICIENCY ANEMIA DUE TO CHRONIC BLOOD LOSS: Status: ACTIVE | Noted: 2018-11-19

## 2022-02-25 PROBLEM — I47.1 PAT (PAROXYSMAL ATRIAL TACHYCARDIA) (HCC): Status: ACTIVE | Noted: 2022-02-25

## 2022-02-25 PROBLEM — J96.02 ACUTE RESPIRATORY FAILURE WITH HYPOXIA AND HYPERCAPNIA (HCC): Status: ACTIVE | Noted: 2020-02-21

## 2022-02-25 PROBLEM — G47.33 OBSTRUCTIVE SLEEP APNEA SYNDROME: Status: ACTIVE | Noted: 2018-02-27

## 2022-02-25 PROBLEM — E87.20 LACTIC ACIDOSIS: Status: ACTIVE | Noted: 2018-02-26

## 2022-02-25 PROBLEM — J22 ACUTE RESPIRATORY INFECTION: Status: ACTIVE | Noted: 2020-02-21

## 2022-02-25 PROBLEM — R09.02 HYPOXIA: Status: ACTIVE | Noted: 2019-04-09

## 2022-02-25 PROBLEM — J96.92 RESPIRATORY FAILURE WITH HYPERCAPNIA (HCC): Status: ACTIVE | Noted: 2020-03-10

## 2022-02-25 PROBLEM — J96.01 ACUTE RESPIRATORY FAILURE WITH HYPOXIA AND HYPERCAPNIA (HCC): Status: ACTIVE | Noted: 2020-02-21

## 2022-02-25 PROBLEM — I50.32 CHRONIC DIASTOLIC HEART FAILURE (HCC): Status: ACTIVE | Noted: 2018-02-28

## 2022-02-25 PROBLEM — L97.512 PLANTAR ULCER OF RIGHT FOOT WITH FAT LAYER EXPOSED (HCC): Status: ACTIVE | Noted: 2018-07-18

## 2022-02-25 PROBLEM — R60.0 LOCALIZED EDEMA: Status: ACTIVE | Noted: 2018-11-19

## 2022-02-25 PROBLEM — M79.7 FIBROMYALGIA: Status: ACTIVE | Noted: 2020-08-27

## 2022-02-25 PROBLEM — I48.0 PAF (PAROXYSMAL ATRIAL FIBRILLATION) (HCC): Status: ACTIVE | Noted: 2019-04-09

## 2022-02-25 PROBLEM — G93.40 ENCEPHALOPATHY: Status: ACTIVE | Noted: 2020-03-10

## 2022-02-25 PROBLEM — I10 ESSENTIAL HYPERTENSION: Status: ACTIVE | Noted: 2022-02-25

## 2022-02-25 PROBLEM — J18.9 LEFT LOWER LOBE PNEUMONIA: Status: ACTIVE | Noted: 2021-08-24

## 2022-02-25 PROBLEM — K21.9 GERD (GASTROESOPHAGEAL REFLUX DISEASE): Status: ACTIVE | Noted: 2022-02-25

## 2022-02-25 PROBLEM — R41.82 ALTERED MENTAL STATUS: Status: ACTIVE | Noted: 2019-04-09

## 2022-02-25 PROBLEM — I83.891 VARICOSE VEINS OF RIGHT LOWER EXTREMITY WITH EDEMA: Status: ACTIVE | Noted: 2019-08-12

## 2022-02-25 PROBLEM — M18.12 ARTHRITIS OF CARPOMETACARPAL (CMC) JOINT OF LEFT THUMB: Status: ACTIVE | Noted: 2019-12-04
